# Patient Record
Sex: FEMALE | Race: WHITE | NOT HISPANIC OR LATINO | URBAN - NONMETROPOLITAN AREA
[De-identification: names, ages, dates, MRNs, and addresses within clinical notes are randomized per-mention and may not be internally consistent; named-entity substitution may affect disease eponyms.]

---

## 2017-05-22 ENCOUNTER — APPOINTMENT (RX ONLY)
Dept: URBAN - NONMETROPOLITAN AREA CLINIC 22 | Facility: CLINIC | Age: 47
Setting detail: DERMATOLOGY
End: 2017-05-22

## 2017-05-22 DIAGNOSIS — Z41.9 ENCOUNTER FOR PROCEDURE FOR PURPOSES OTHER THAN REMEDYING HEALTH STATE, UNSPECIFIED: ICD-10-CM

## 2017-05-22 PROCEDURE — ? PRODUCT LINE (HYPERPIGMENTATION)

## 2017-05-22 PROCEDURE — ? MEDICAL CONSULTATION HYDRAFACIAL

## 2017-05-22 PROCEDURE — ? MEDICAL CONSULTATION: CHEMICAL PEELS

## 2017-05-22 PROCEDURE — ? MEDICAL CONSULTATION: PRODUCTS

## 2017-05-22 PROCEDURE — 99211 OFF/OP EST MAY X REQ PHY/QHP: CPT

## 2017-05-22 PROCEDURE — ? HYDRAFACIAL

## 2017-05-22 PROCEDURE — ? PRODUCT LINE (SUNSCREENS)

## 2017-05-22 ASSESSMENT — LOCATION DETAILED DESCRIPTION DERM
LOCATION DETAILED: LEFT MEDIAL FOREHEAD
LOCATION DETAILED: INFERIOR MID FOREHEAD
LOCATION DETAILED: RIGHT INFERIOR MEDIAL FOREHEAD

## 2017-05-22 ASSESSMENT — LOCATION SIMPLE DESCRIPTION DERM
LOCATION SIMPLE: RIGHT FOREHEAD
LOCATION SIMPLE: LEFT FOREHEAD
LOCATION SIMPLE: INFERIOR FOREHEAD

## 2017-05-22 ASSESSMENT — LOCATION ZONE DERM: LOCATION ZONE: FACE

## 2017-05-22 NOTE — PROCEDURE: PRODUCT LINE (HYPERPIGMENTATION)
Product 41 Price (In Dollars - Numeric Only, No Special Characters Or $): 0.00
Product 15 Units: 0
Send Charges To Patient Encounter: Yes
Name Of Product 1: SkinCeuticals Advanced Pigment Corrector
Product 3 Application Directions: $154.00 + $9.24 = $163.24
Product 2 Price (In Dollars - Numeric Only, No Special Characters Or $): 218.36
Product 4 Application Directions: $130.00 + $7.80 = $137.80
Product 2 Application Directions: $206.00 + $ 12.36 = $218.36
Product 1 Price (In Dollars - Numeric Only, No Special Characters Or $): 95.40
Product 3 Price (In Dollars - Numeric Only, No Special Characters Or $): 163.24
Name Of Product 3: SkinMedica Lytera 2.0
Product 4 Price (In Dollars - Numeric Only, No Special Characters Or $): 137.80
Detail Level: Zone
Name Of Product 4: SkinMedica Lytera Brightening Complex
Product 1 Application Directions: $90.00 + $5.40= $95.40\\n\\nApply in the AM & PM\\n\\nPt recieved my employee discount.
Product 3 Units: 1
Name Of Product 2: SkinMedica Lytera Kit

## 2017-05-22 NOTE — PROCEDURE: PRODUCT LINE (SUNSCREENS)
Product 7 Application Directions: $15.00 + $.90 = $15.90
Product 76 Price (In Dollars - Numeric Only, No Special Characters Or $): 0.00
Product 64 Units: 0
Product 1 Price (In Dollars - Numeric Only, No Special Characters Or $): 27.56
Detail Level: Zone
Product 2 Price (In Dollars - Numeric Only, No Special Characters Or $): 15.90
Product 11 Price (In Dollars - Numeric Only, No Special Characters Or $): 33.92
Name Of Product 8: SkinCeutcals Physical Eye Uv Defense
Product 6 Units: 1
Name Of Product 6: SkinCeuticals Physical Fusion UV Defense
Name Of Product 1: NIA24 Sundamage Prevention Sunscreen PA
Name Of Product 11: Elta UV Lotion
Product 4 Application Directions: $26.00 + $1.56 = $27.56
Product 10 Application Directions: $9.00 + $1.50 = $10.50
Product 9 Price (In Dollars - Numeric Only, No Special Characters Or $): 47.79
Product 5 Price (In Dollars - Numeric Only, No Special Characters Or $): 26.50
Name Of Product 3: Cera Ve Face Lotion
Name Of Product 2: Blue Lizzard Baby
Send Charges To Patient Encounter: Yes
Product 3 Price (In Dollars - Numeric Only, No Special Characters Or $): 21.20
Product 5 Application Directions: $25.00 + $1.50 = $26.50
Product 10 Price (In Dollars - Numeric Only, No Special Characters Or $): 10.50
Product 1 Application Directions: NIA6\\n\\n$26.00 + $1.56= $27.56
Product 6 Application Directions: $34.00 + $2.04 = $36.04\\n\\n.
Product 3 Application Directions: $20.00 + $1.20 = $21.20
Name Of Product 4: Elta MD Daily
Product 11 Application Directions: $32.00 + $1.92 = $33.92
Name Of Product 10: Tizo Liptect
Product 8 Price (In Dollars - Numeric Only, No Special Characters Or $): 31.80
Name Of Product 5: Tizo
Product 9 Application Directions: $45.00 + $2.70 = $47.70
Product 6 Price (In Dollars - Numeric Only, No Special Characters Or $): 36.04
Product 8 Application Directions: $30.00 + $1.80 = $31.80
Name Of Product 7: Elta MD Clear
Name Of Product 9: Colorescience Brush

## 2017-05-22 NOTE — PROCEDURE: HYDRAFACIAL
Glycolic Acid %: 15%
Treatment Number: 1
Post-Care Instructions: I reviewed with the patient in detail post-care instructions. Patient should stay away from the sun and wear sun protection until treated areas are fully healed.
Price (Use Numbers Only, No Special Characters Or $): 150.00
Comments: Pt tolerated procedure without any complications.
Consent: Prior to treatment, verbal consent was obtained and risks were reviewed including but not limited to crusting, scabbing, blistering, scarring, darker or lighter pigmentary change, bruising, and/or incomplete response.
Detail Level: Zone
Vacuum Pressure: 17
Indication: skin texture

## 2017-06-20 ENCOUNTER — APPOINTMENT (RX ONLY)
Dept: URBAN - NONMETROPOLITAN AREA CLINIC 22 | Facility: CLINIC | Age: 47
Setting detail: DERMATOLOGY
End: 2017-06-20

## 2017-06-20 DIAGNOSIS — Z41.9 ENCOUNTER FOR PROCEDURE FOR PURPOSES OTHER THAN REMEDYING HEALTH STATE, UNSPECIFIED: ICD-10-CM

## 2017-06-20 PROCEDURE — ? ACNE SURGERY COSMETIC

## 2017-06-20 PROCEDURE — ? CHEMICAL PEEL

## 2017-06-20 ASSESSMENT — LOCATION ZONE DERM
LOCATION ZONE: FACE
LOCATION ZONE: NOSE

## 2017-06-20 ASSESSMENT — LOCATION DETAILED DESCRIPTION DERM
LOCATION DETAILED: LEFT INFERIOR CENTRAL MALAR CHEEK
LOCATION DETAILED: RIGHT CHIN
LOCATION DETAILED: RIGHT INFERIOR NASAL CHEEK
LOCATION DETAILED: RIGHT INFERIOR MEDIAL FOREHEAD
LOCATION DETAILED: RIGHT INFERIOR CENTRAL MALAR CHEEK
LOCATION DETAILED: LEFT NASAL SIDEWALL

## 2017-06-20 ASSESSMENT — LOCATION SIMPLE DESCRIPTION DERM
LOCATION SIMPLE: LEFT NOSE
LOCATION SIMPLE: LEFT CHEEK
LOCATION SIMPLE: RIGHT CHEEK
LOCATION SIMPLE: CHIN
LOCATION SIMPLE: RIGHT FOREHEAD

## 2017-06-20 NOTE — PROCEDURE: CHEMICAL PEEL
Treatment Number: 1
Consent: Prior to treatment, risks were reviewed, including but not limited to: redness, peeling, blistering, pigmentary change, scarring, infection, and pain.
Chemical Peel: MicroPeel Sensitive 2
Post Peel Care: After the procedure, sunscreen was applied and post-care instructions were reviewed with the patient
Detail Level: Zone
Price (Use Numbers Only, No Special Characters Or $): 65.00
Prep: The treated area was prepped simply clean cleanser and alcohol, hydrabalm was applied for the protection of mucous membranes.
Post-Care Instructions: I reviewed with the patient in detail post-care instructions. Patient should avoid sun exposure and wear sun protection.
Comments: Pt tolerated procedure without any complications. \\n\\nShe is very tan today. I wanted to start with a light peel to see how she tolerated it. At next visit we will step the peel up.

## 2017-06-20 NOTE — HPI: COSMETIC (CHEMICAL PEEL)
Additional History: She's interested in a lighter peel today to start out. She said she's been outside a lot but has been wearing lots of sunscreen on her face. She also has a cocktail party at the end of the week.

## 2017-06-20 NOTE — PROCEDURE: ACNE SURGERY COSMETIC
Acne Type: Comedonal Lesions
Prep Text (Optional): Prior to removal the treatment areas were prepped in the usual fashion.
Consent: Verbal consent was obtained. Risks were reviewed including but not limited to scarring, infection, bleeding, scabbing, and incomplete removal.
Render Post-Care Instructions In Note?: no
Detail Level: Simple
Extraction Method: comedo extractor
Post-Care Instructions: I reviewed with the patient in detail post-care instructions. Patient is to keep the treatment areas dry overnight.

## 2017-07-18 ENCOUNTER — APPOINTMENT (RX ONLY)
Dept: URBAN - NONMETROPOLITAN AREA CLINIC 22 | Facility: CLINIC | Age: 47
Setting detail: DERMATOLOGY
End: 2017-07-18

## 2017-07-18 DIAGNOSIS — Z41.9 ENCOUNTER FOR PROCEDURE FOR PURPOSES OTHER THAN REMEDYING HEALTH STATE, UNSPECIFIED: ICD-10-CM

## 2017-07-18 PROCEDURE — ? HYDRAFACIAL

## 2017-07-18 ASSESSMENT — LOCATION ZONE DERM: LOCATION ZONE: FACE

## 2017-07-18 ASSESSMENT — LOCATION SIMPLE DESCRIPTION DERM: LOCATION SIMPLE: RIGHT FOREHEAD

## 2017-07-18 ASSESSMENT — LOCATION DETAILED DESCRIPTION DERM: LOCATION DETAILED: RIGHT INFERIOR MEDIAL FOREHEAD

## 2018-06-14 ENCOUNTER — APPOINTMENT (RX ONLY)
Dept: URBAN - NONMETROPOLITAN AREA CLINIC 22 | Facility: CLINIC | Age: 48
Setting detail: DERMATOLOGY
End: 2018-06-14

## 2018-06-14 VITALS — HEIGHT: 67 IN | WEIGHT: 195 LBS

## 2018-06-14 DIAGNOSIS — Z71.89 OTHER SPECIFIED COUNSELING: ICD-10-CM

## 2018-06-14 DIAGNOSIS — L73.8 OTHER SPECIFIED FOLLICULAR DISORDERS: ICD-10-CM

## 2018-06-14 DIAGNOSIS — L57.8 OTHER SKIN CHANGES DUE TO CHRONIC EXPOSURE TO NONIONIZING RADIATION: ICD-10-CM

## 2018-06-14 DIAGNOSIS — L82.0 INFLAMED SEBORRHEIC KERATOSIS: ICD-10-CM

## 2018-06-14 DIAGNOSIS — D22 MELANOCYTIC NEVI: ICD-10-CM

## 2018-06-14 PROBLEM — D22.5 MELANOCYTIC NEVI OF TRUNK: Status: ACTIVE | Noted: 2018-06-14

## 2018-06-14 PROCEDURE — 99213 OFFICE O/P EST LOW 20 MIN: CPT

## 2018-06-14 PROCEDURE — ? DEFER

## 2018-06-14 PROCEDURE — ? EDUCATIONAL RESOURCES PROVIDED

## 2018-06-14 PROCEDURE — ? COUNSELING

## 2018-06-14 ASSESSMENT — LOCATION SIMPLE DESCRIPTION DERM
LOCATION SIMPLE: RIGHT FOREHEAD
LOCATION SIMPLE: CHEST
LOCATION SIMPLE: RIGHT CHEEK

## 2018-06-14 ASSESSMENT — LOCATION DETAILED DESCRIPTION DERM
LOCATION DETAILED: RIGHT CENTRAL MALAR CHEEK
LOCATION DETAILED: RIGHT INFERIOR FOREHEAD
LOCATION DETAILED: LEFT MEDIAL SUPERIOR CHEST

## 2018-06-14 ASSESSMENT — LOCATION ZONE DERM
LOCATION ZONE: FACE
LOCATION ZONE: TRUNK

## 2018-06-14 NOTE — PROCEDURE: DEFER
Instructions (Optional): Pt will return in the fall to have cryo
Instructions (Optional): Pt will return in the fall for cryo
Detail Level: Detailed

## 2018-10-29 ENCOUNTER — APPOINTMENT (RX ONLY)
Dept: URBAN - NONMETROPOLITAN AREA CLINIC 22 | Facility: CLINIC | Age: 48
Setting detail: DERMATOLOGY
End: 2018-10-29

## 2018-10-29 DIAGNOSIS — Z41.9 ENCOUNTER FOR PROCEDURE FOR PURPOSES OTHER THAN REMEDYING HEALTH STATE, UNSPECIFIED: ICD-10-CM

## 2018-10-29 PROCEDURE — ? TREATMENT REGIMEN

## 2018-10-29 PROCEDURE — ? PRODUCT LINE (ANTI-AGING)

## 2018-10-29 PROCEDURE — ? HYDRAFACIAL

## 2018-10-29 PROCEDURE — ? PRODUCT LINE (ACNE)

## 2018-10-29 PROCEDURE — ? IN-HOUSE DISPENSING PHARMACY

## 2018-10-29 ASSESSMENT — LOCATION DETAILED DESCRIPTION DERM
LOCATION DETAILED: RIGHT FOREHEAD
LOCATION DETAILED: LEFT INFERIOR CENTRAL MALAR CHEEK
LOCATION DETAILED: RIGHT CHIN
LOCATION DETAILED: RIGHT SUPERIOR LATERAL BUCCAL CHEEK
LOCATION DETAILED: RIGHT INFERIOR CENTRAL MALAR CHEEK
LOCATION DETAILED: NASAL DORSUM
LOCATION DETAILED: LEFT FOREHEAD

## 2018-10-29 ASSESSMENT — LOCATION SIMPLE DESCRIPTION DERM
LOCATION SIMPLE: CHIN
LOCATION SIMPLE: NOSE
LOCATION SIMPLE: LEFT CHEEK
LOCATION SIMPLE: RIGHT FOREHEAD
LOCATION SIMPLE: LEFT FOREHEAD
LOCATION SIMPLE: RIGHT CHEEK

## 2018-10-29 ASSESSMENT — LOCATION ZONE DERM
LOCATION ZONE: FACE
LOCATION ZONE: NOSE

## 2018-10-29 NOTE — PROCEDURE: PRODUCT LINE (ACNE)
Product 79 Units: 0
Product 17 Price (In Dollars - Numeric Only, No Special Characters Or $): 0.00
Product 7 Application Directions: Pt received 10% off for October Special
Product 4 Price (In Dollars - Numeric Only, No Special Characters Or $): 64.17
Name Of Product 1: R|Essentials Triple Antioxidant Serum
Send Charges To Patient Encounter: Yes
Name Of Product 6: 5/2 Gly/Sal Pads
Product 1 Price (In Dollars - Numeric Only, No Special Characters Or $): 79.38
Product 2 Application Directions: Pt received 10% off per MLK
Product 7 Price (In Dollars - Numeric Only, No Special Characters Or $): 41.58
Product 2 Price (In Dollars - Numeric Only, No Special Characters Or $): 24.57
Detail Level: Zone
Name Of Product 11: R|E 5/2 Theraputic Pads
Product 6 Price (In Dollars - Numeric Only, No Special Characters Or $): 21.74
Name Of Product 4: Avene Retrinal 0.05% Cream
Name Of Product 2: R|Essentials Lite Moisture Cream
Product 7 Units: 1
Product 5 Price (In Dollars - Numeric Only, No Special Characters Or $): 24.30
Product 11 Price (In Dollars - Numeric Only, No Special Characters Or $): 24.15
Product 6 Application Directions: Pt dad was incorrectly charged by FD for only 1 container. FD contacting parents for remainder of balance.
Name Of Product 5: 10/2 Gly/Sal Cleanser
Name Of Product 7: R|E Brightening Polish
Product 3 Price (In Dollars - Numeric Only, No Special Characters Or $): 45.15
Name Of Product 3: R|Essentials Tinted Mineral Sunscreen
Product 5 Application Directions: 10% off for October promotion per MLK
Product 11 Application Directions: Wipe face post exercise/sports PRN no more than 1x daily

## 2018-10-29 NOTE — HPI: COSMETIC (HYDRAFACIAL)
When Outside In The Sun, Do You...: mostly tans, rarely burns
Additional History: Pt very concerned about discoloration on face.

## 2018-10-29 NOTE — PROCEDURE: PRODUCT LINE (ANTI-AGING)
Product 13 Price (In Dollars - Numeric Only, No Special Characters Or $): 84.00
Product 73 Units: 0
Product 59 Price (In Dollars - Numeric Only, No Special Characters Or $): 0.00
Product 1 Price (In Dollars - Numeric Only, No Special Characters Or $): 90.30
Product 16 Price (In Dollars - Numeric Only, No Special Characters Or $): 80.50
Product 15 Price (In Dollars - Numeric Only, No Special Characters Or $): 103.01
Product 9 Price (In Dollars - Numeric Only, No Special Characters Or $): 88.20
Name Of Product 6: SkinMedica TNS Recovery Complex
Product 25 Price (In Dollars - Numeric Only, No Special Characters Or $): 84.11
Product 4 Price (In Dollars - Numeric Only, No Special Characters Or $): 40.64
Product 11 Price (In Dollars - Numeric Only, No Special Characters Or $): 145.06
Product 6 Price (In Dollars - Numeric Only, No Special Characters Or $): 205.85
Name Of Product 19: AHA|BHA Exfoliating Cleanser
Product 8 Price (In Dollars - Numeric Only, No Special Characters Or $): 73.60
Name Of Product 2: TNS Essential Serum 1.0 oz
Name Of Product 15: R|Essentials Carminerblanca C Serum
Product 21 Price (In Dollars - Numeric Only, No Special Characters Or $): 16.38
Product 2 Price (In Dollars - Numeric Only, No Special Characters Or $): 323.15
Name Of Product 25: R|Essentials Retinol Serum 10x
Name Of Product 5: R|E Advanced Corrective Cream
Name Of Product 24: TNS Recovery Complex
Name Of Product 22: R|E Foaming AHA 10% Cleanser
Product 7 Price (In Dollars - Numeric Only, No Special Characters Or $): 189.47
Name Of Product 7: CE Ferulic
Product 3 Price (In Dollars - Numeric Only, No Special Characters Or $): 81.90
Name Of Product 10: Phloretin Gel
Name Of Product 18: R|E Antioxidant Infused Gentle Foamy CLeanser
Name Of Product 14: Antioxident Infused Foamy Cleanser
Product 25 Application Directions: Product is 10% off this month
Product 18 Price (In Dollars - Numeric Only, No Special Characters Or $): 38.08
Render Product Pricing In Note: Yes
Name Of Product 8: Skinceuticals Retinol 0.05%
Product 27 Application Directions: Pt received 10% off for October Special. Hydroquinone 4% added additionally. See In House Dispensing Pharmacy Rx.
Name Of Product 11: Triple Lipid Restore
Name Of Product 27: R|E Brightening Pads
Name Of Product 23: AGE Eye Serum
Detail Level: Zone
Name Of Product 12: Advanced Eye Serum
Name Of Product 31: R|E Retinol Body Lotion
Product 22 Price (In Dollars - Numeric Only, No Special Characters Or $): 19.53
Name Of Product 20: LHA Cleanser
Product 1 Application Directions: Apply Q HS with moisturizer overtop. Pt mostly concerned about puffiness and darkness. Pt unhappy with previous OTC eye creams. Pt will contact office with questions or concerns.
Product 31 Price (In Dollars - Numeric Only, No Special Characters Or $): 73.71
Product 17 Price (In Dollars - Numeric Only, No Special Characters Or $): 71.30
Product 24 Application Directions: Pt does not want to spend more money on TNS Essential Serum and wanted to discuss other options. As she still wants a growth factor, recommended she purchase TNS Recovery Complex to use (as it is cheaper) and purchase CE Ferulic for an antioxident. Pt is agreeable and would like to trial CE Ferulic First. Will mail TNS Recovery Complex to pt (charged $5.50 for shipping) and will send along CE Ferulic 5mL sample for patient to try. Also sent Triple Lipid Restore sample. Pt will contact office once she trials samples for further discussion.
Name Of Product 9: R|Essentials Triple Antioxidant Cream
Product 27 Price (In Dollars - Numeric Only, No Special Characters Or $): 60.48
Name Of Product 1: R|E Advanced Eye Cream
Name Of Product 17: Avene RetrinAL 0.05%
Product 14 Price (In Dollars - Numeric Only, No Special Characters Or $): 32.55
Product 15 Application Directions: Pt Recieved 10% off today for October Special
Product 4 Application Directions: Pt recieved 10% off for October Special
Name Of Product 13: R|E Ultra Hydrating Serum
Product 7 Application Directions: Also Discussed R/Essentials Retinol 5x and SkinMedica Retinol.
Product 19 Price (In Dollars - Numeric Only, No Special Characters Or $): 54.05
Product 27 Units: 1
Name Of Product 4: R|E Tinted Physical Sunscreen SPF 50
Name Of Product 21: R|E Lite Moisturizing Cream
Product 24 Price (In Dollars - Numeric Only, No Special Characters Or $): 174.97
Name Of Product 16: Avene RetrinAL 0.1%
Name Of Product 3: R|E Retinol Serum 5X
Product 20 Price (In Dollars - Numeric Only, No Special Characters Or $): 46.00
Product 23 Price (In Dollars - Numeric Only, No Special Characters Or $): 94.82
Product 12 Price (In Dollars - Numeric Only, No Special Characters Or $): 82.80

## 2018-10-29 NOTE — PROCEDURE: HYDRAFACIAL
Comments: Applied R|E Supercharged C Serum and R|E Ultra Hydrating Serum to face, then applied SkinCeuticals Phytocorrective Masque for extra calming and hydration. Wiped masque off with soft gauze soaked in cool water. Another layer of R|E Hydrating Serum applied to face with R|E Sheer Physical sunscreen overtop. Pt happy with face upon departure.
Post-Care Instructions: I reviewed with the patient in detail post-care instructions. Patient should stay away from the sun and wear sun protection until treated areas are fully healed. Pt tolerated procedure well without complications and was mildly pink upon departure.
Vacuum Pressure: 18
Price (Use Numbers Only, No Special Characters Or $): 150
Indication: skin texture
Consent: Written consent obtained, risks reviewed including but not limited to crusting, scabbing, blistering, scarring, darker or lighter pigmentary change, bruising, and/or incomplete response.
Glycolic Acid %: 15%
Detail Level: Zone
Number Of Passes: 1
Additional Vacuum Pressure (Won't Render If 0): 16
Additional Vacuum Pressure (Won't Render If 0): 24
Treatment Number: 3

## 2018-10-29 NOTE — PROCEDURE: TREATMENT REGIMEN
Initiate Treatment: Pt would like to start using hydroquinone pads for discoloration. Pt will use R|E Brightening pads with 4% Hydroquinone Q AM to face x 6 months during winter then d/c 6 months in summer. Pt will apply R|E Supercharged C Serum Q HS to face x6m during winter, then BID x 6m during summer months. Pt will also use R|E Brightening Polish 2-3x weekly for extra exfoliation. Pt using [employer] products now and would like to continue using. If pt becomes dry during winter (pt is typically oily) recommended R|E Advanced Correction Cream or SkinCeuticals Triple Lipid Restore Q HS for added moisturization during dry months. Discussed risks and benefits of hydroquinone use and pt is agreeable to use.
Detail Level: Detailed

## 2018-10-29 NOTE — PROCEDURE: IN-HOUSE DISPENSING PHARMACY
Product 38 Amount/Unit (Numbers Only): 0
Product 46 Unit Type: mg
Product 1 Price/Unit (In Dollars): 15.12
Detail Level: Zone
Render Product Pricing In Note: Yes
Name Of Product 1: Hydroquinone 2% Powder (Addition to R|E Brightening Pads)-1 Premeasured Bottle
Product 1 Units Dispensed: 2
Product 1 Unit Type: bottle(s)
Product 1 Application Directions: Apply Pad to face QD-BID as directed.

## 2020-01-01 NOTE — PROCEDURE: HYDRAFACIAL
Vacuum Pressure: 18
Additional Vacuum Pressure (Won't Render If 0): 0
Treatment Number: 2
Number Of Passes: 1
Glycolic Acid %: 15%
Indication: skin texture
Price (Use Numbers Only, No Special Characters Or $): 150
Post-Care Instructions: I reviewed with the patient in detail post-care instructions. Patient should stay away from the sun and wear sun protection until treated areas are fully healed.
Consent: Prior to treatment, risks were reviewed including but not limited to crusting, scabbing, blistering, scarring, darker or lighter pigmentary change, bruising, and/or incomplete response.
Comments: Pt tolerated procedure without any complications.
Detail Level: Zone
21.06

## 2021-09-03 ENCOUNTER — APPOINTMENT (RX ONLY)
Dept: URBAN - NONMETROPOLITAN AREA CLINIC 22 | Facility: CLINIC | Age: 51
Setting detail: DERMATOLOGY
End: 2021-09-03

## 2021-09-03 VITALS — WEIGHT: 200 LBS | HEIGHT: 67 IN

## 2021-09-03 DIAGNOSIS — L73.8 OTHER SPECIFIED FOLLICULAR DISORDERS: ICD-10-CM

## 2021-09-03 DIAGNOSIS — L81.1 CHLOASMA: ICD-10-CM

## 2021-09-03 DIAGNOSIS — L21.8 OTHER SEBORRHEIC DERMATITIS: ICD-10-CM | Status: INADEQUATELY CONTROLLED

## 2021-09-03 PROCEDURE — ? PRESCRIPTION MEDICATION MANAGEMENT

## 2021-09-03 PROCEDURE — ? TREATMENT REGIMEN

## 2021-09-03 PROCEDURE — ? ADDITIONAL NOTES

## 2021-09-03 PROCEDURE — ? COUNSELING

## 2021-09-03 PROCEDURE — ? PRESCRIPTION

## 2021-09-03 PROCEDURE — 99214 OFFICE O/P EST MOD 30 MIN: CPT

## 2021-09-03 RX ORDER — KETOCONAZOLE 20 MG/G
CREAM TOPICAL BID
Qty: 30 | Refills: 3 | Status: ERX

## 2021-09-03 RX ORDER — HYDROCORTISONE 25 MG/G
OINTMENT TOPICAL
Qty: 28.35 | Refills: 0 | Status: ERX | COMMUNITY
Start: 2021-09-03

## 2021-09-03 RX ADMIN — HYDROCORTISONE 1: 25 OINTMENT TOPICAL at 00:00

## 2021-09-03 ASSESSMENT — LOCATION DETAILED DESCRIPTION DERM
LOCATION DETAILED: PHILTRUM
LOCATION DETAILED: LEFT INFERIOR MEDIAL MALAR CHEEK

## 2021-09-03 ASSESSMENT — LOCATION ZONE DERM
LOCATION ZONE: LIP
LOCATION ZONE: FACE

## 2021-09-03 ASSESSMENT — LOCATION SIMPLE DESCRIPTION DERM
LOCATION SIMPLE: LEFT CHEEK
LOCATION SIMPLE: UPPER LIP

## 2021-09-03 NOTE — PROCEDURE: PRESCRIPTION MEDICATION MANAGEMENT
Discontinue Regimen: Clobetasol 0.05% cream at this time.
Detail Level: Zone
Initiate Treatment: ketoconazole 2 % topical cream BID\\nQuantity: 30.0 g  Days Supply: 30\\nSig: Apply twice daily to rash.\\n\\nhydrocortisone 2.5 % topical ointment \\nQuantity: 28.35 g  Days Supply: 30\\nSig: Apply bid x 2 weeks max when flared to affected areas
Render In Strict Bullet Format?: No
breastfeeding exclusively

## 2021-10-22 ENCOUNTER — APPOINTMENT (RX ONLY)
Dept: URBAN - NONMETROPOLITAN AREA CLINIC 22 | Facility: CLINIC | Age: 51
Setting detail: DERMATOLOGY
End: 2021-10-22

## 2021-10-22 VITALS — WEIGHT: 200 LBS | HEIGHT: 67 IN

## 2021-10-22 DIAGNOSIS — L21.8 OTHER SEBORRHEIC DERMATITIS: ICD-10-CM | Status: IMPROVED

## 2021-10-22 DIAGNOSIS — D22 MELANOCYTIC NEVI: ICD-10-CM

## 2021-10-22 DIAGNOSIS — L82.1 OTHER SEBORRHEIC KERATOSIS: ICD-10-CM

## 2021-10-22 DIAGNOSIS — L57.0 ACTINIC KERATOSIS: ICD-10-CM | Status: INADEQUATELY CONTROLLED

## 2021-10-22 DIAGNOSIS — D18.0 HEMANGIOMA: ICD-10-CM

## 2021-10-22 DIAGNOSIS — L73.8 OTHER SPECIFIED FOLLICULAR DISORDERS: ICD-10-CM

## 2021-10-22 DIAGNOSIS — L81.1 CHLOASMA: ICD-10-CM

## 2021-10-22 DIAGNOSIS — L57.8 OTHER SKIN CHANGES DUE TO CHRONIC EXPOSURE TO NONIONIZING RADIATION: ICD-10-CM

## 2021-10-22 PROBLEM — D22.5 MELANOCYTIC NEVI OF TRUNK: Status: ACTIVE | Noted: 2021-10-22

## 2021-10-22 PROBLEM — D18.01 HEMANGIOMA OF SKIN AND SUBCUTANEOUS TISSUE: Status: ACTIVE | Noted: 2021-10-22

## 2021-10-22 PROCEDURE — ? COUNSELING

## 2021-10-22 PROCEDURE — ? LIQUID NITROGEN

## 2021-10-22 PROCEDURE — ? ADDITIONAL NOTES

## 2021-10-22 PROCEDURE — 17000 DESTRUCT PREMALG LESION: CPT

## 2021-10-22 PROCEDURE — ? PRESCRIPTION MEDICATION MANAGEMENT

## 2021-10-22 PROCEDURE — ? FULL BODY SKIN EXAM

## 2021-10-22 PROCEDURE — 99214 OFFICE O/P EST MOD 30 MIN: CPT | Mod: 25

## 2021-10-22 PROCEDURE — ? TREATMENT REGIMEN

## 2021-10-22 ASSESSMENT — LOCATION SIMPLE DESCRIPTION DERM
LOCATION SIMPLE: RIGHT BACK
LOCATION SIMPLE: LEFT UPPER BACK
LOCATION SIMPLE: UPPER LIP
LOCATION SIMPLE: CHEST
LOCATION SIMPLE: LEFT CHEEK

## 2021-10-22 ASSESSMENT — LOCATION ZONE DERM
LOCATION ZONE: TRUNK
LOCATION ZONE: LIP
LOCATION ZONE: FACE

## 2021-10-22 ASSESSMENT — LOCATION DETAILED DESCRIPTION DERM
LOCATION DETAILED: RIGHT SUPERIOR LATERAL UPPER BACK
LOCATION DETAILED: LEFT SUPERIOR MEDIAL UPPER BACK
LOCATION DETAILED: RIGHT MEDIAL SUPERIOR CHEST
LOCATION DETAILED: PHILTRUM
LOCATION DETAILED: LEFT SUPERIOR MEDIAL BUCCAL CHEEK
LOCATION DETAILED: STERNAL NOTCH
LOCATION DETAILED: LEFT INFERIOR MEDIAL MALAR CHEEK

## 2021-10-22 NOTE — PROCEDURE: LIQUID NITROGEN
Detail Level: Detailed
Render Post-Care Instructions In Note?: yes
Number Of Freeze-Thaw Cycles: 1 freeze-thaw cycle
Render Note In Bullet Format When Appropriate: No
Duration Of Freeze Thaw-Cycle (Seconds): 10
Consent: The patient's consent was obtained including but not limited to risks of crusting, scabbing, blistering, scarring, darker or lighter pigmentary change, recurrence, incomplete removal and infection.
Post-Care Instructions: I reviewed with the patient in detail post-care instructions. Patient is to wear sunprotection, and avoid picking at any of the treated lesions. Pt may apply Vaseline to crusted or scabbing areas.

## 2021-10-22 NOTE — PROCEDURE: PRESCRIPTION MEDICATION MANAGEMENT
Continue Regimen: hydrocortisone 2.5 % topical ointment bid prn\\nClobex 0.05% cream bid prn
Detail Level: Zone
Render In Strict Bullet Format?: No

## 2021-10-22 NOTE — PROCEDURE: MIPS QUALITY
Quality 226: Preventive Care And Screening: Tobacco Use: Screening And Cessation Intervention: Patient screened for tobacco use and is an ex/non-smoker
Quality 130: Documentation Of Current Medications In The Medical Record: Current Medications Documented
Quality 431: Preventive Care And Screening: Unhealthy Alcohol Use - Screening: Patient not identified as an unhealthy alcohol user when screened for unhealthy alcohol use using a systematic screening method
Detail Level: Simple

## 2021-12-20 ENCOUNTER — APPOINTMENT (RX ONLY)
Dept: URBAN - NONMETROPOLITAN AREA CLINIC 22 | Facility: CLINIC | Age: 51
Setting detail: DERMATOLOGY
End: 2021-12-20

## 2021-12-20 VITALS — HEIGHT: 67 IN | WEIGHT: 200 LBS

## 2021-12-20 DIAGNOSIS — L57.0 ACTINIC KERATOSIS: ICD-10-CM | Status: INADEQUATELY CONTROLLED

## 2021-12-20 PROCEDURE — ? COUNSELING

## 2021-12-20 PROCEDURE — ? LIQUID NITROGEN

## 2021-12-20 PROCEDURE — ? ADDITIONAL NOTES

## 2021-12-20 PROCEDURE — 17000 DESTRUCT PREMALG LESION: CPT

## 2021-12-20 ASSESSMENT — LOCATION SIMPLE DESCRIPTION DERM: LOCATION SIMPLE: LEFT CHEEK

## 2021-12-20 ASSESSMENT — LOCATION ZONE DERM: LOCATION ZONE: FACE

## 2021-12-20 ASSESSMENT — LOCATION DETAILED DESCRIPTION DERM: LOCATION DETAILED: LEFT SUPERIOR MEDIAL BUCCAL CHEEK

## 2023-03-17 ENCOUNTER — APPOINTMENT (RX ONLY)
Dept: URBAN - NONMETROPOLITAN AREA CLINIC 22 | Facility: CLINIC | Age: 53
Setting detail: DERMATOLOGY
End: 2023-03-17

## 2023-03-17 VITALS — HEIGHT: 67 IN | WEIGHT: 200 LBS

## 2023-03-17 DIAGNOSIS — L73.8 OTHER SPECIFIED FOLLICULAR DISORDERS: ICD-10-CM | Status: STABLE

## 2023-03-17 DIAGNOSIS — D22 MELANOCYTIC NEVI: ICD-10-CM | Status: STABLE

## 2023-03-17 DIAGNOSIS — D18.0 HEMANGIOMA: ICD-10-CM | Status: STABLE

## 2023-03-17 DIAGNOSIS — L82.1 OTHER SEBORRHEIC KERATOSIS: ICD-10-CM | Status: STABLE

## 2023-03-17 DIAGNOSIS — L57.8 OTHER SKIN CHANGES DUE TO CHRONIC EXPOSURE TO NONIONIZING RADIATION: ICD-10-CM | Status: STABLE

## 2023-03-17 DIAGNOSIS — Z41.9 ENCOUNTER FOR PROCEDURE FOR PURPOSES OTHER THAN REMEDYING HEALTH STATE, UNSPECIFIED: ICD-10-CM

## 2023-03-17 PROBLEM — D22.5 MELANOCYTIC NEVI OF TRUNK: Status: ACTIVE | Noted: 2023-03-17

## 2023-03-17 PROBLEM — D18.01 HEMANGIOMA OF SKIN AND SUBCUTANEOUS TISSUE: Status: ACTIVE | Noted: 2023-03-17

## 2023-03-17 PROCEDURE — ? ADDITIONAL NOTES

## 2023-03-17 PROCEDURE — ? COUNSELING

## 2023-03-17 PROCEDURE — ? TREATMENT REGIMEN

## 2023-03-17 PROCEDURE — 99213 OFFICE O/P EST LOW 20 MIN: CPT

## 2023-03-17 PROCEDURE — ? FULL BODY SKIN EXAM

## 2023-03-17 ASSESSMENT — LOCATION DETAILED DESCRIPTION DERM
LOCATION DETAILED: LEFT SUPERIOR MEDIAL UPPER BACK
LOCATION DETAILED: RIGHT CENTRAL TEMPLE
LOCATION DETAILED: STERNAL NOTCH
LOCATION DETAILED: RIGHT MEDIAL SUPERIOR CHEST
LOCATION DETAILED: RIGHT SUPERIOR LATERAL UPPER BACK

## 2023-03-17 ASSESSMENT — LOCATION SIMPLE DESCRIPTION DERM
LOCATION SIMPLE: CHEST
LOCATION SIMPLE: RIGHT BACK
LOCATION SIMPLE: LEFT UPPER BACK
LOCATION SIMPLE: RIGHT TEMPLE

## 2023-03-17 ASSESSMENT — LOCATION ZONE DERM
LOCATION ZONE: FACE
LOCATION ZONE: TRUNK

## 2023-03-17 NOTE — PROCEDURE: TREATMENT REGIMEN
Detail Level: Zone
Initiate Treatment: Sun screen (SPF 30 or greater) should be applied every 1.5-2 hours, during peak UV exposure (between 10am and 2pm) and reapplied after exercise or swimming.
Plan: The ABCDEs of melanoma were reviewed with the patient, and the importance of monthly self-examination of moles was emphasized. Should any moles change in shape or color, or itch, bleed or burn, pt will contact our office for evaluation sooner then their interval appointment.\\nSun protective clothing is also effective at protecting against UV rays that cause skin cancer.
Plan: recommend  consult

## 2023-04-21 ENCOUNTER — APPOINTMENT (RX ONLY)
Dept: URBAN - NONMETROPOLITAN AREA CLINIC 22 | Facility: CLINIC | Age: 53
Setting detail: DERMATOLOGY
End: 2023-04-21

## 2023-04-21 DIAGNOSIS — Z41.9 ENCOUNTER FOR PROCEDURE FOR PURPOSES OTHER THAN REMEDYING HEALTH STATE, UNSPECIFIED: ICD-10-CM

## 2023-04-21 PROCEDURE — ? HYDRAFACIAL

## 2023-04-21 ASSESSMENT — LOCATION DETAILED DESCRIPTION DERM
LOCATION DETAILED: RIGHT CENTRAL MALAR CHEEK
LOCATION DETAILED: LEFT INFERIOR CENTRAL MALAR CHEEK
LOCATION DETAILED: SUPERIOR MID FOREHEAD

## 2023-04-21 ASSESSMENT — LOCATION SIMPLE DESCRIPTION DERM
LOCATION SIMPLE: LEFT CHEEK
LOCATION SIMPLE: RIGHT CHEEK
LOCATION SIMPLE: SUPERIOR FOREHEAD

## 2023-04-21 ASSESSMENT — LOCATION ZONE DERM: LOCATION ZONE: FACE

## 2023-04-21 NOTE — PROCEDURE: HYDRAFACIAL
Price (Use Numbers Only, No Special Characters Or $): 578 Price (Use Numbers Only, No Special Characters Or $): 523

## 2024-04-12 ENCOUNTER — APPOINTMENT (RX ONLY)
Dept: URBAN - NONMETROPOLITAN AREA CLINIC 22 | Facility: CLINIC | Age: 54
Setting detail: DERMATOLOGY
End: 2024-04-12

## 2024-04-12 VITALS — WEIGHT: 200 LBS | HEIGHT: 67 IN

## 2024-04-12 DIAGNOSIS — L81.4 OTHER MELANIN HYPERPIGMENTATION: ICD-10-CM | Status: INADEQUATELY CONTROLLED

## 2024-04-12 DIAGNOSIS — L82.1 OTHER SEBORRHEIC KERATOSIS: ICD-10-CM | Status: STABLE

## 2024-04-12 DIAGNOSIS — L57.8 OTHER SKIN CHANGES DUE TO CHRONIC EXPOSURE TO NONIONIZING RADIATION: ICD-10-CM | Status: STABLE

## 2024-04-12 DIAGNOSIS — D18.0 HEMANGIOMA: ICD-10-CM | Status: STABLE

## 2024-04-12 DIAGNOSIS — D22 MELANOCYTIC NEVI: ICD-10-CM | Status: STABLE

## 2024-04-12 PROBLEM — D18.01 HEMANGIOMA OF SKIN AND SUBCUTANEOUS TISSUE: Status: ACTIVE | Noted: 2024-04-12

## 2024-04-12 PROBLEM — D22.5 MELANOCYTIC NEVI OF TRUNK: Status: ACTIVE | Noted: 2024-04-12

## 2024-04-12 PROCEDURE — ? TREATMENT REGIMEN

## 2024-04-12 PROCEDURE — ? FULL BODY SKIN EXAM

## 2024-04-12 PROCEDURE — 99213 OFFICE O/P EST LOW 20 MIN: CPT

## 2024-04-12 PROCEDURE — ? COUNSELING

## 2024-04-12 ASSESSMENT — LOCATION ZONE DERM
LOCATION ZONE: TRUNK
LOCATION ZONE: FACE

## 2024-04-12 ASSESSMENT — LOCATION DETAILED DESCRIPTION DERM
LOCATION DETAILED: RIGHT SUPERIOR LATERAL UPPER BACK
LOCATION DETAILED: LEFT SUPERIOR MEDIAL UPPER BACK
LOCATION DETAILED: RIGHT MEDIAL SUPERIOR CHEST
LOCATION DETAILED: STERNAL NOTCH
LOCATION DETAILED: LEFT INFERIOR LATERAL MALAR CHEEK

## 2024-04-12 ASSESSMENT — LOCATION SIMPLE DESCRIPTION DERM
LOCATION SIMPLE: LEFT UPPER BACK
LOCATION SIMPLE: CHEST
LOCATION SIMPLE: LEFT CHEEK
LOCATION SIMPLE: RIGHT BACK

## 2024-04-12 NOTE — PROCEDURE: TREATMENT REGIMEN
Initiate Treatment: Sun screen (SPF 30 or greater) should be applied every 1.5-2 hours, during peak UV exposure (between 10am and 2pm) and reapplied after exercise or swimming.
Plan: The ABCDEs of melanoma were reviewed with the patient, and the importance of monthly self-examination of moles was emphasized. Should any moles change in shape or color, or itch, bleed or burn, pt will contact our office for evaluation sooner then their interval appointment.\\nSun protective clothing is also effective at protecting against UV rays that cause skin cancer.
Detail Level: Zone
Plan: recommend lightening creams, IPL

## 2025-07-15 ENCOUNTER — APPOINTMENT (OUTPATIENT)
Dept: URBAN - NONMETROPOLITAN AREA CLINIC 22 | Facility: CLINIC | Age: 55
Setting detail: DERMATOLOGY
End: 2025-07-15

## 2025-07-15 VITALS — WEIGHT: 190 LBS | HEIGHT: 66 IN

## 2025-07-15 DIAGNOSIS — D22 MELANOCYTIC NEVI: ICD-10-CM | Status: STABLE

## 2025-07-15 DIAGNOSIS — L81.4 OTHER MELANIN HYPERPIGMENTATION: ICD-10-CM | Status: STABLE

## 2025-07-15 DIAGNOSIS — D18.0 HEMANGIOMA: ICD-10-CM | Status: STABLE

## 2025-07-15 DIAGNOSIS — L57.8 OTHER SKIN CHANGES DUE TO CHRONIC EXPOSURE TO NONIONIZING RADIATION: ICD-10-CM | Status: STABLE

## 2025-07-15 DIAGNOSIS — L82.1 OTHER SEBORRHEIC KERATOSIS: ICD-10-CM | Status: STABLE

## 2025-07-15 DIAGNOSIS — L56.8 OTHER SPECIFIED ACUTE SKIN CHANGES DUE TO ULTRAVIOLET RADIATION: ICD-10-CM

## 2025-07-15 PROBLEM — D22.5 MELANOCYTIC NEVI OF TRUNK: Status: ACTIVE | Noted: 2025-07-15

## 2025-07-15 PROBLEM — D18.01 HEMANGIOMA OF SKIN AND SUBCUTANEOUS TISSUE: Status: ACTIVE | Noted: 2025-07-15

## 2025-07-15 PROCEDURE — ? COUNSELING

## 2025-07-15 PROCEDURE — ? TREATMENT REGIMEN

## 2025-07-15 PROCEDURE — ? FULL BODY SKIN EXAM

## 2025-07-15 ASSESSMENT — LOCATION DETAILED DESCRIPTION DERM
LOCATION DETAILED: RIGHT ANTERIOR SHOULDER
LOCATION DETAILED: LEFT SUPERIOR MEDIAL UPPER BACK
LOCATION DETAILED: RIGHT SUPERIOR LATERAL UPPER BACK
LOCATION DETAILED: STERNAL NOTCH
LOCATION DETAILED: RIGHT SUPERIOR UPPER BACK
LOCATION DETAILED: RIGHT MEDIAL SUPERIOR CHEST
LOCATION DETAILED: LEFT INFERIOR LATERAL MALAR CHEEK

## 2025-07-15 ASSESSMENT — LOCATION SIMPLE DESCRIPTION DERM
LOCATION SIMPLE: RIGHT BACK
LOCATION SIMPLE: RIGHT SHOULDER
LOCATION SIMPLE: LEFT UPPER BACK
LOCATION SIMPLE: CHEST
LOCATION SIMPLE: LEFT CHEEK
LOCATION SIMPLE: RIGHT UPPER BACK

## 2025-07-15 ASSESSMENT — LOCATION ZONE DERM
LOCATION ZONE: FACE
LOCATION ZONE: ARM
LOCATION ZONE: TRUNK

## 2025-07-15 NOTE — PROCEDURE: TREATMENT REGIMEN
Detail Level: Zone
Plan: recommend lightening creams, IPL
Initiate Treatment: Sun screen (SPF 30 or greater) should be applied every 1.5-2 hours, during peak UV exposure (between 10am and 2pm) and reapplied after exercise or swimming.
Plan: The ABCDEs of melanoma were reviewed with the patient, and the importance of monthly self-examination of moles was emphasized. Should any moles change in shape or color, or itch, bleed or burn, pt will contact our office for evaluation sooner then their interval appointment.\\nSun protective clothing is also effective at protecting against UV rays that cause skin cancer.